# Patient Record
Sex: FEMALE | Race: WHITE | NOT HISPANIC OR LATINO
[De-identification: names, ages, dates, MRNs, and addresses within clinical notes are randomized per-mention and may not be internally consistent; named-entity substitution may affect disease eponyms.]

---

## 2024-05-24 ENCOUNTER — APPOINTMENT (OUTPATIENT)
Dept: THORACIC SURGERY | Facility: CLINIC | Age: 69
End: 2024-05-24
Payer: MEDICARE

## 2024-05-24 ENCOUNTER — TRANSCRIPTION ENCOUNTER (OUTPATIENT)
Age: 69
End: 2024-05-24

## 2024-05-24 DIAGNOSIS — R91.1 SOLITARY PULMONARY NODULE: ICD-10-CM

## 2024-05-24 PROBLEM — Z00.00 ENCOUNTER FOR PREVENTIVE HEALTH EXAMINATION: Status: ACTIVE | Noted: 2024-05-24

## 2024-05-24 PROCEDURE — 99205 OFFICE O/P NEW HI 60 MIN: CPT

## 2024-05-30 ENCOUNTER — RESULT REVIEW (OUTPATIENT)
Age: 69
End: 2024-05-30

## 2024-05-30 ENCOUNTER — APPOINTMENT (OUTPATIENT)
Dept: THORACIC SURGERY | Facility: HOSPITAL | Age: 69
End: 2024-05-30

## 2024-06-05 ENCOUNTER — RESULT REVIEW (OUTPATIENT)
Age: 69
End: 2024-06-05

## 2024-06-05 NOTE — HISTORY OF PRESENT ILLNESS
[FreeTextEntry1] : 69-year-old female with a history of tobacco in the past.  The patient has cough and hoarse voice.  The patient had a CAT scan of the chest that shows a large left upper lobe mass with possible mediastinal invasion concerning for malignancy.  She has been recommended a PET scan to evaluate uptake (scheduled for 6/5 at [place]) and look for distant disease.  The patient is now s/p bronchoscopy with endobronchial ultrasound for biopsy as well as lymph node evaluation 5/30. She now presents to discuss pathology.  Pathology as follows:  A. Bronchial wash: POSITIVE FOR MALIGNANCY - Squamous cell carcinoma  B. Left hilar mass, endoscopic ultrasound guided fine needle aspiration: POSITIVE FOR MALIGNANCY - Squamous cell carcinoma  C. Subcarinal lymph node, endoscopic ultrasound guided fine needle aspiration:  SUSPICIOUS FOR MALIGNANCY - Rare clusters of atypical epithelial cells present highly suspicious for malignancy. - Heterogeneous lymphocytes, reactive bronchial epithelial cells and fibroadipose fragments present  LEFT UPPER LOBE, BIOPSY: - Squamous cell carcinoma; see comment.

## 2024-06-06 ENCOUNTER — APPOINTMENT (OUTPATIENT)
Dept: THORACIC SURGERY | Facility: CLINIC | Age: 69
End: 2024-06-06

## 2024-06-07 ENCOUNTER — RESULT REVIEW (OUTPATIENT)
Age: 69
End: 2024-06-07

## 2024-06-09 ENCOUNTER — NON-APPOINTMENT (OUTPATIENT)
Age: 69
End: 2024-06-09

## 2024-06-11 DIAGNOSIS — F41.9 ANXIETY DISORDER, UNSPECIFIED: ICD-10-CM

## 2024-06-11 RX ORDER — DIAZEPAM 2 MG/1
2 TABLET ORAL
Qty: 2 | Refills: 0 | Status: ACTIVE | COMMUNITY
Start: 2024-06-11 | End: 1900-01-01

## 2024-06-12 RX ORDER — OLMESARTAN MEDOXOMIL 20 MG/1
20 TABLET, FILM COATED ORAL
Refills: 0 | Status: ACTIVE | COMMUNITY

## 2024-06-12 RX ORDER — LEVOTHYROXINE SODIUM 175 UG/1
175 TABLET ORAL
Refills: 0 | Status: ACTIVE | COMMUNITY

## 2024-06-12 RX ORDER — SERTRALINE HYDROCHLORIDE 100 MG/1
100 TABLET, FILM COATED ORAL
Refills: 0 | Status: ACTIVE | COMMUNITY

## 2024-06-12 RX ORDER — SEMAGLUTIDE 0.68 MG/ML
2 INJECTION, SOLUTION SUBCUTANEOUS
Refills: 0 | Status: ACTIVE | COMMUNITY

## 2024-06-12 RX ORDER — VITAMIN K2 90 MCG
125 MCG CAPSULE ORAL
Refills: 0 | Status: ACTIVE | COMMUNITY

## 2024-06-12 RX ORDER — BUPROPION HYDROCHLORIDE 150 MG/1
150 TABLET, EXTENDED RELEASE ORAL
Refills: 0 | Status: ACTIVE | COMMUNITY

## 2024-06-12 RX ORDER — ROSUVASTATIN CALCIUM 40 MG/1
40 TABLET, FILM COATED ORAL
Refills: 0 | Status: ACTIVE | COMMUNITY

## 2024-06-13 ENCOUNTER — APPOINTMENT (OUTPATIENT)
Dept: THORACIC SURGERY | Facility: CLINIC | Age: 69
End: 2024-06-13
Payer: MEDICARE

## 2024-06-13 DIAGNOSIS — Z83.49 FAMILY HISTORY OF OTHER ENDOCRINE, NUTRITIONAL AND METABOLIC DISEASES: ICD-10-CM

## 2024-06-13 DIAGNOSIS — Z86.39 PERSONAL HISTORY OF OTHER ENDOCRINE, NUTRITIONAL AND METABOLIC DISEASE: ICD-10-CM

## 2024-06-13 DIAGNOSIS — H26.9 UNSPECIFIED CATARACT: ICD-10-CM

## 2024-06-13 DIAGNOSIS — E78.5 HYPERLIPIDEMIA, UNSPECIFIED: ICD-10-CM

## 2024-06-13 DIAGNOSIS — Z82.49 FAMILY HISTORY OF ISCHEMIC HEART DISEASE AND OTHER DISEASES OF THE CIRCULATORY SYSTEM: ICD-10-CM

## 2024-06-13 DIAGNOSIS — Z87.891 PERSONAL HISTORY OF NICOTINE DEPENDENCE: ICD-10-CM

## 2024-06-13 PROCEDURE — 99213 OFFICE O/P EST LOW 20 MIN: CPT

## 2024-06-13 NOTE — ASSESSMENT
[FreeTextEntry1] : 70 y/o F with new diagnosis of stage III squamous cell lung cancer. She has seen Dr. Goldberg for oncologic evaluation as well as Dr. Lubin for rad/onc.   She now presents to discuss surgical placement of [id number] for systemic treatment. Risks and benefits of the surgery have been discussed. Preoperative instructions have been clarified. I spent time discussing what to expect and all questions have been answered.  Patient will be scheduled for an [id number] on 6/19 at 1:00 pm at [facility].  Patient and spouse expressed understanding and agree.

## 2024-06-13 NOTE — PHYSICAL EXAM
[Sclera] : the sclera and conjunctiva were normal [PERRL With Normal Accommodation] : pupils were equal in size, round, and reactive to light [Extraocular Movements] : extraocular movements were intact [Neck Appearance] : the appearance of the neck was normal [Neck Cervical Mass (___cm)] : no neck mass was observed [Jugular Venous Distention Increased] : there was no jugular-venous distention [Thyroid Diffuse Enlargement] : the thyroid was not enlarged [Thyroid Nodule] : there were no palpable thyroid nodules [Auscultation Breath Sounds / Voice Sounds] : lungs were clear to auscultation bilaterally [Heart Rate And Rhythm] : heart rate was normal and rhythm regular [Heart Sounds] : normal S1 and S2 [Heart Sounds Gallop] : no gallops [Murmurs] : no murmurs [Heart Sounds Pericardial Friction Rub] : no pericardial rub [Examination Of The Chest] : the chest was normal in appearance [Chest Visual Inspection Thoracic Asymmetry] : no chest asymmetry [Diminished Respiratory Excursion] : normal chest expansion [Bowel Sounds] : normal bowel sounds [Abdomen Soft] : soft [Abdomen Tenderness] : non-tender [] : no hepato-splenomegaly [Abdomen Mass (___ Cm)] : no abdominal mass palpated [Abnormal Walk] : normal gait [Nail Clubbing] : no clubbing  or cyanosis of the fingernails [Musculoskeletal - Swelling] : no joint swelling seen [Motor Tone] : muscle strength and tone were normal [Deep Tendon Reflexes (DTR)] : deep tendon reflexes were 2+ and symmetric [Sensation] : the sensory exam was normal to light touch and pinprick [No Focal Deficits] : no focal deficits [Oriented To Time, Place, And Person] : oriented to person, place, and time [Impaired Insight] : insight and judgment were intact [Affect] : the affect was normal

## 2024-06-13 NOTE — DATA REVIEWED
[FreeTextEntry1] : Brain MRI: pending  PET 6/5/2024 IMPRESSION:  1. 4.2 x 7.2 cm posterior left upper lobe mass extending into the left hilum and left apex, SUV max 30.6, consistent with malignancy.  2. Hypermetabolic 22 mm supradiaphragmatic nodule at the left lung base, consistent with metastatic disease.  3. 14 x 22 mm groundglass nodule at the posterior right lung apex, SUV max 1.2, probably representing malignancy. Biopsy may be of benefit.  4. Prominent mildly FDG avid left level 3 cervical node, most likely representing metastatic disease.  5. Underdistention versus bladder wall thickening. Correlate for cystitis.

## 2024-06-13 NOTE — HISTORY OF PRESENT ILLNESS
[FreeTextEntry1] : 69-year-old female with a history of tobacco in the past. The patient has cough and hoarse voice. The patient had a CAT scan of the chest that shows a large left upper lobe mass with possible mediastinal invasion concerning for malignancy. She has been recommended a PET scan to evaluate uptake (scheduled for 6/5 at [place]) and look for distant disease. The patient is now s/p bronchoscopy with endobronchial ultrasound for biopsy as well as lymph node evaluation 5/30.  Pathology as follows: A. Bronchial wash: POSITIVE FOR MALIGNANCY - Squamous cell carcinoma  B. Left hilar mass, endoscopic ultrasound guided fine needle aspiration: POSITIVE FOR MALIGNANCY - Squamous cell carcinoma  C. Subcarinal lymph node, endoscopic ultrasound guided fine needle aspiration: SUSPICIOUS FOR MALIGNANCY - Rare clusters of atypical epithelial cells present highly suspicious for malignancy. - Heterogeneous lymphocytes, reactive bronchial epithelial cells and fibroadipose fragments present  LEFT UPPER LOBE, BIOPSY: - Squamous cell carcinoma; see comment.  She has been referred to Dr. Goldberg (oncology) and presents today for consultation for infusaport insertion.

## 2024-06-14 ENCOUNTER — APPOINTMENT (OUTPATIENT)
Dept: RADIATION ONCOLOGY | Facility: CLINIC | Age: 69
End: 2024-06-14
Payer: MEDICARE

## 2024-06-14 VITALS
HEART RATE: 88 BPM | BODY MASS INDEX: 29.76 KG/M2 | SYSTOLIC BLOOD PRESSURE: 131 MMHG | OXYGEN SATURATION: 97 % | HEIGHT: 64 IN | RESPIRATION RATE: 16 BRPM | WEIGHT: 174.31 LBS | DIASTOLIC BLOOD PRESSURE: 84 MMHG

## 2024-06-14 DIAGNOSIS — Z80.8 FAMILY HISTORY OF MALIGNANT NEOPLASM OF OTHER ORGANS OR SYSTEMS: ICD-10-CM

## 2024-06-14 DIAGNOSIS — C34.90 MALIGNANT NEOPLASM OF UNSPECIFIED PART OF UNSPECIFIED BRONCHUS OR LUNG: ICD-10-CM

## 2024-06-14 PROCEDURE — 99204 OFFICE O/P NEW MOD 45 MIN: CPT | Mod: 25

## 2024-06-14 NOTE — PHYSICAL EXAM
[Exaggerated Use Of Accessory Muscles For Inspiration] : no accessory muscle use [] : no respiratory distress [Heart Rate And Rhythm] : heart rate and rhythm were normal [Arterial Pulses Normal] : the arterial pulses were normal [Abdomen Soft] : soft [Nondistended] : nondistended [No Focal Deficits] : no focal deficits [Normal] : oriented to person, place and time, the affect was normal, the mood was normal and not anxious [de-identified] : No mucosal lesions noted in oral cavity or oropharynx [de-identified] : No neck nodes palpable.  No supraclavicular nodes

## 2024-06-14 NOTE — REVIEW OF SYSTEMS
[Chest Pain] : chest pain [Shortness Of Breath] : shortness of breath [SOB on Exertion] : shortness of breath during exertion [Negative] : Neurological [Dysphagia] : no dysphagia [Palpitations] : no palpitations [Cough] : no cough [Abdominal Pain] : no abdominal pain [Constipation] : no constipation [Diarrhea] : no diarrhea

## 2024-06-14 NOTE — HISTORY OF PRESENT ILLNESS
[FreeTextEntry1] : Ms. Mcdonald is a 69-year-old female with newly diagnosed Stage III squamous cell lung cancer of the ELAN and Stage I lung cancer of the LLL, history of tobacco in the past.  She initially presented with worsening cough for which a chest XRay was ordered.   04/29/24 ([facility]) Chest Xray revealed a mass-like consolidation of the left upper lung and was placed on antibiotics. She then had a repeat Xray on 5/14/24 showing a stable to slight interval increased masslike opacity ELAN.   05/21/24 CT Chest ([facility]) revealed:  A large ELAN mass measuring approximately 6.7cm in greatest diameter, appears to invade the mediastinum. Ill-definied groundglass density in the RUL measuring approximately 2.1cm, 3mm calcified nodules in the ELAN, 7mm ovoid groundglass density LLL, 4mm groundglass density RLL, 2mm RLL nodule, 5mm RLL nodule. Mild dependent atelectatic changes.   05/30/24 Bronchoscopy with Dr. Martinez. Pathology revealed a ELAN squamous cell carcinoma, positive for P40 and focally CK7. - Bronchial wash and left hilar mass confirmed squamous cell carcinoma.  - Subcarinal lymph node was found to have rare clusters of atypical epithelial cells present highly suspicious for malignancy.    06/05/24 PET/CT at [place]  1. 4.2 x 7.2 cm posterior left upper lobe mass extending into the left hilum and left apex, SUV max 30.6, consistent with malignancy.   2. Hypermetabolic 22 mm supradiaphragmatic nodule at the left lung base, consistent with metastatic disease.   3. 14 x 22 mm groundglass nodule at the posterior right lung apex, SUV max 1.2, probably representing malignancy. Biopsy may be of benefit.   4. Prominent mildly FDG avid left level 3 cervical node, most likely representing metastatic disease.  5. Underdistention versus bladder wall thickening. Correlate for cystitis.   06/07/24 MRI Brain was performed at [place] but showed limited evaluation for intracranial metastatic disease in the absence of IV gadolinium, she will have a repeat MRI on 6/16/24.  6/14/24 Today, she presents for consultation to discuss treatment with radiation. She denies any hemoptysis but reports of mild SOB and chest pain which is relieved with Tylenol. She had a consultation with Dr. Goldberg on 6/7/24 and discussed chemotherapy. She was also referred to see an ENT doctor by Dr. Goldberg and was told she has left vocal cord paralysis. She is scheduled to see Dr. Goldberg's team today and will be seeing him next week. She will have a port placed on 6/19/24.

## 2024-06-16 ENCOUNTER — RESULT REVIEW (OUTPATIENT)
Age: 69
End: 2024-06-16

## 2024-06-17 ENCOUNTER — TRANSCRIPTION ENCOUNTER (OUTPATIENT)
Age: 69
End: 2024-06-17

## 2024-06-19 ENCOUNTER — APPOINTMENT (OUTPATIENT)
Dept: THORACIC SURGERY | Facility: HOSPITAL | Age: 69
End: 2024-06-19

## 2024-06-19 ENCOUNTER — TRANSCRIPTION ENCOUNTER (OUTPATIENT)
Age: 69
End: 2024-06-19

## 2024-06-19 ENCOUNTER — RESULT REVIEW (OUTPATIENT)
Age: 69
End: 2024-06-19

## 2024-06-20 ENCOUNTER — RESULT REVIEW (OUTPATIENT)
Age: 69
End: 2024-06-20

## 2024-06-21 ENCOUNTER — RESULT REVIEW (OUTPATIENT)
Age: 69
End: 2024-06-21

## 2024-06-25 ENCOUNTER — TRANSCRIPTION ENCOUNTER (OUTPATIENT)
Age: 69
End: 2024-06-25

## 2024-06-25 ENCOUNTER — NON-APPOINTMENT (OUTPATIENT)
Age: 69
End: 2024-06-25

## 2024-07-10 ENCOUNTER — NON-APPOINTMENT (OUTPATIENT)
Age: 69
End: 2024-07-10

## 2024-07-10 VITALS
DIASTOLIC BLOOD PRESSURE: 79 MMHG | RESPIRATION RATE: 16 BRPM | SYSTOLIC BLOOD PRESSURE: 152 MMHG | HEIGHT: 64 IN | BODY MASS INDEX: 29.53 KG/M2 | WEIGHT: 173 LBS | OXYGEN SATURATION: 97 % | HEART RATE: 89 BPM

## 2024-07-16 ENCOUNTER — NON-APPOINTMENT (OUTPATIENT)
Age: 69
End: 2024-07-16

## 2024-07-16 VITALS
HEART RATE: 100 BPM | DIASTOLIC BLOOD PRESSURE: 78 MMHG | WEIGHT: 174 LBS | HEIGHT: 64 IN | BODY MASS INDEX: 29.71 KG/M2 | OXYGEN SATURATION: 99 % | SYSTOLIC BLOOD PRESSURE: 128 MMHG | RESPIRATION RATE: 16 BRPM

## 2024-07-18 ENCOUNTER — NON-APPOINTMENT (OUTPATIENT)
Age: 69
End: 2024-07-18

## 2024-07-24 ENCOUNTER — NON-APPOINTMENT (OUTPATIENT)
Age: 69
End: 2024-07-24

## 2024-07-24 VITALS
OXYGEN SATURATION: 99 % | RESPIRATION RATE: 16 BRPM | SYSTOLIC BLOOD PRESSURE: 115 MMHG | HEART RATE: 98 BPM | WEIGHT: 173 LBS | BODY MASS INDEX: 29.7 KG/M2 | DIASTOLIC BLOOD PRESSURE: 75 MMHG

## 2024-07-24 RX ORDER — ZOLPIDEM TARTRATE 5 MG/1
5 TABLET ORAL
Qty: 30 | Refills: 0 | Status: ACTIVE | COMMUNITY
Start: 2024-07-24 | End: 1900-01-01

## 2024-07-24 NOTE — REVIEW OF SYSTEMS
[Esophagitis: Grade 1 - Asymptomatic; clinical or diagnostic observations only; intervention not indicated] : Esophagitis: Grade 1 - Asymptomatic; clinical or diagnostic observations only; intervention not indicated [Fatigue: Grade 1 - Fatigue relieved by rest] : Fatigue: Grade 1 - Fatigue relieved by rest [Cough: Grade 0] : Cough: Grade 0 [Hoarseness: Grade 0] : Hoarseness: Grade 0 [Pharyngeal Mucositis: Grade 0] : Pharyngeal Mucositis: Grade 0

## 2024-07-24 NOTE — DISEASE MANAGEMENT
[Clinical] : TNM Stage: c [III] : III [TTNM] : 4 [NTNM] : 0 [MTNM] : 0 [de-identified] : 2123 [de-identified] : [id number] [de-identified] : Left lung/LLL

## 2024-07-24 NOTE — HISTORY OF PRESENT ILLNESS
[FreeTextEntry1] : 7/10/2024 Ms. Mcdonald presents today for her OTV.  She completed 3/30 fractions to the left lung and 3/30 fractions to the LLL.   The patient reports difficulty lying down for extended periods of time. She has been taking Tylenol for her discomfort. Her pain today was 6 out of 10.  She also reports having a dry cough.  She denies any difficulty swallowing or SOB.    7/16/2024 Ms. Mcdonald presents today for her OTV.  She completed 7/30 fractions to the left lung and 7/30 fractions to the LLL.  The patient reports having a dry cough, hoarseness and SOB on exertion.  She was seen in Oncology yesterday for her treatment, they prescribed Biotin and lidocaine mouth wash.  She has pain in the sternum and her back.  Her pain level is 3/10 and 7/10 at night while lying down.  7/24/24  FX 13 to the left lung and left lower lobe.  She is feeling well but does c/o some irritation in the upper esophageal area that is causing her to not drink well.  She is having IV hydration at [facility] today and will also see Dr Goldberg today.  She continues to have sternal and back pain worse when lying flat

## 2024-07-31 ENCOUNTER — NON-APPOINTMENT (OUTPATIENT)
Age: 69
End: 2024-07-31

## 2024-07-31 VITALS
OXYGEN SATURATION: 98 % | DIASTOLIC BLOOD PRESSURE: 73 MMHG | BODY MASS INDEX: 30.21 KG/M2 | SYSTOLIC BLOOD PRESSURE: 117 MMHG | HEART RATE: 98 BPM | RESPIRATION RATE: 16 BRPM | WEIGHT: 176 LBS

## 2024-07-31 NOTE — HISTORY OF PRESENT ILLNESS
[FreeTextEntry1] : 7/10/2024 Ms. Mcdonald presents today for her OTV.  She completed 3/30 fractions to the left lung and 3/30 fractions to the LLL.   The patient reports difficulty lying down for extended periods of time. She has been taking Tylenol for her discomfort. Her pain today was 6 out of 10.  She also reports having a dry cough.  She denies any difficulty swallowing or SOB.    7/16/2024 Ms. Mcdonald presents today for her OTV.  She completed 7/30 fractions to the left lung and 7/30 fractions to the LLL.  The patient reports having a dry cough, hoarseness and SOB on exertion.  She was seen in Oncology yesterday for her treatment, they prescribed Biotin and lidocaine mouth wash.  She has pain in the sternum and her back.  Her pain level is 3/10 and 7/10 at night while lying down.  7/24/24  FX 13 to the left lung and left lower lobe.  She is feeling well but does c/o some irritation in the upper esophageal area that is causing her to not drink well.  She is having IV hydration at [facility] today and will also see Dr Goldberg today.  She continues to have sternal and back pain worse when lying flat  7/31/24 FX  18 of 30 to LLL This week she is eating more small frequent meals and feels her voice is hoarse.  She continues to have a cough at times.  Her appetite remains fair.   .

## 2024-07-31 NOTE — DISEASE MANAGEMENT
[Clinical] : TNM Stage: c [III] : III [TTNM] : 4 [NTNM] : 0 [MTNM] : 0 [de-identified] : [id number] [de-identified] : [id number] [de-identified] : Left lung/LLL

## 2024-07-31 NOTE — REVIEW OF SYSTEMS
[Fatigue: Grade 1 - Fatigue relieved by rest] : Fatigue: Grade 1 - Fatigue relieved by rest [Oral Pain: Grade 1 - Mild pain] : Oral Pain: Grade 1 - Mild pain [Cough: Grade 1 - Mild symptoms; nonprescription intervention indicated] : Cough: Grade 1 - Mild symptoms; nonprescription intervention indicated [Hoarseness: Grade 1 - Mild or intermittent voice change; fully understandable; self-resolves] : Hoarseness: Grade 1 - Mild or intermittent voice change; fully understandable; self-resolves

## 2024-08-06 ENCOUNTER — NON-APPOINTMENT (OUTPATIENT)
Age: 69
End: 2024-08-06

## 2024-08-06 NOTE — REVIEW OF SYSTEMS
[Nausea: Grade 0] : Nausea: Grade 0 [Fatigue: Grade 1 - Fatigue relieved by rest] : Fatigue: Grade 1 - Fatigue relieved by rest [Cough: Grade 1 - Mild symptoms; nonprescription intervention indicated] : Cough: Grade 1 - Mild symptoms; nonprescription intervention indicated [Dyspnea: Grade 0] : Dyspnea: Grade 0 [Hoarseness: Grade 0] : Hoarseness: Grade 0 [Pharyngeal Mucositis: Grade 0] : Pharyngeal Mucositis: Grade 0 [Pneumonitis: Grade 0] : Pneumonitis: Grade 0 [Voice Alteration: Grade 1 - Mild or intermittent change from normal voice] : Voice Alteration: Grade 1 - Mild or intermittent change from normal voice

## 2024-08-07 NOTE — HISTORY OF PRESENT ILLNESS
[FreeTextEntry1] : 7/10/2024 Ms. Mcdonald presents today for her OTV.  She completed 3/30 fractions to the left lung and 3/30 fractions to the LLL.   The patient reports difficulty lying down for extended periods of time. She has been taking Tylenol for her discomfort. Her pain today was 6 out of 10.  She also reports having a dry cough.  She denies any difficulty swallowing or SOB.    7/16/2024 Ms. Mcdonald presents today for her OTV.  She completed 7/30 fractions to the left lung and 7/30 fractions to the LLL.  The patient reports having a dry cough, hoarseness and SOB on exertion.  She was seen in Oncology yesterday for her treatment, they prescribed Biotin and lidocaine mouth wash.  She has pain in the sternum and her back.  Her pain level is 3/10 and 7/10 at night while lying down.  7/24/24  FX 13 to the left lung and left lower lobe.  She is feeling well but does c/o some irritation in the upper esophageal area that is causing her to not drink well.  She is having IV hydration at [facility] today and will also see Dr Goldberg today.  She continues to have sternal and back pain worse when lying flat  7/31/24 FX  18 of 30 to LLL This week she is eating more small frequent meals and feels her voice is hoarse.  She continues to have a cough at times.  Her appetite remains fair.    8/6/24 FX 22 of 30 Pt reports that she is receiving her chemotherapy this weel at [facility] ( cisplatin  16).  She is feeling well and denied any nausea.  She also has no SOB but does have a persistent cough ( no blood).  Her appetite has been good and her weight is stable .

## 2024-08-07 NOTE — DISEASE MANAGEMENT
[Clinical] : TNM Stage: c [III] : III [TTNM] : 4 [NTNM] : 0 [MTNM] : 0 [de-identified] : 5077 [de-identified] : 7075 [de-identified] : Left lung/LLL

## 2024-08-07 NOTE — DISEASE MANAGEMENT
[Clinical] : TNM Stage: c [III] : III [TTNM] : 4 [NTNM] : 0 [MTNM] : 0 [de-identified] : 5787 [de-identified] : 1330 [de-identified] : Left lung/LLL

## 2024-08-07 NOTE — HISTORY OF PRESENT ILLNESS
[FreeTextEntry1] : 7/10/2024 Ms. Mcdonald presents today for her OTV.  She completed 3/30 fractions to the left lung and 3/30 fractions to the LLL.   The patient reports difficulty lying down for extended periods of time. She has been taking Tylenol for her discomfort. Her pain today was 6 out of 10.  She also reports having a dry cough.  She denies any difficulty swallowing or SOB.    7/16/2024 Ms. Mcdonald presents today for her OTV.  She completed 7/30 fractions to the left lung and 7/30 fractions to the LLL.  The patient reports having a dry cough, hoarseness and SOB on exertion.  She was seen in Oncology yesterday for her treatment, they prescribed Biotin and lidocaine mouth wash.  She has pain in the sternum and her back.  Her pain level is 3/10 and 7/10 at night while lying down.  7/24/24  FX 13 to the left lung and left lower lobe.  She is feeling well but does c/o some irritation in the upper esophageal area that is causing her to not drink well.  She is having IV hydration at [facility] today and will also see Dr Goldberg today.  She continues to have sternal and back pain worse when lying flat  7/31/24 FX  18 of 30 to LLL This week she is eating more small frequent meals and feels her voice is hoarse.  She continues to have a cough at times.  Her appetite remains fair.    8/6/24 FX 22 of 30 Pt reports that she is receiving her chemotherapy this weel at [facility] ( cisplatin  16).  She is feeling well and denied any nausea.  She also has no SOB but does have a persistent cough ( no blood).  Her appetite has been good and her weight is stable .

## 2024-08-14 ENCOUNTER — NON-APPOINTMENT (OUTPATIENT)
Age: 69
End: 2024-08-14

## 2024-08-14 VITALS
OXYGEN SATURATION: 97 % | SYSTOLIC BLOOD PRESSURE: 106 MMHG | WEIGHT: 173 LBS | BODY MASS INDEX: 29.53 KG/M2 | DIASTOLIC BLOOD PRESSURE: 67 MMHG | RESPIRATION RATE: 16 BRPM | HEART RATE: 95 BPM | HEIGHT: 64 IN

## 2024-08-14 DIAGNOSIS — C34.90 MALIGNANT NEOPLASM OF UNSPECIFIED PART OF UNSPECIFIED BRONCHUS OR LUNG: ICD-10-CM

## 2024-08-14 NOTE — DISEASE MANAGEMENT
[Clinical] : TNM Stage: c [III] : III [TTNM] : 4 [NTNM] : 0 [MTNM] : 0 [de-identified] : 0966 [de-identified] : 3112 [de-identified] : Left lung/LLL

## 2024-08-14 NOTE — REVIEW OF SYSTEMS
[Fatigue: Grade 1 - Fatigue relieved by rest] : Fatigue: Grade 1 - Fatigue relieved by rest [Cough: Grade 1 - Mild symptoms; nonprescription intervention indicated] : Cough: Grade 1 - Mild symptoms; nonprescription intervention indicated [Dyspnea: Grade 0] : Dyspnea: Grade 0 [Pharyngeal Mucositis: Grade 0] : Pharyngeal Mucositis: Grade 0 [Pneumonitis: Grade 0] : Pneumonitis: Grade 0 [Voice Alteration: Grade 1 - Mild or intermittent change from normal voice] : Voice Alteration: Grade 1 - Mild or intermittent change from normal voice [Dermatitis Radiation: Grade 1 - Faint erythema or dry desquamation] : Dermatitis Radiation: Grade 1 - Faint erythema or dry desquamation

## 2024-09-09 NOTE — HISTORY OF PRESENT ILLNESS
[FreeTextEntry1] : 9/20/2024 PTE Ms. Mcdonald returns today for a PTE.  She is now s/p RT to the Left Lung to 60 GY from 7/8 to 8/16/2024.  (no scans ordered for this visit)

## 2024-09-09 NOTE — DISEASE MANAGEMENT
[Clinical] : TNM Stage: c [TTNM] : 4 [NTNM] : 0 [MTNM] : 0 [III] : III [de-identified] : 0966 [de-identified] : [id number] [de-identified] : Left lung/LLL completed on 8/16/24

## 2024-09-20 ENCOUNTER — APPOINTMENT (OUTPATIENT)
Dept: RADIATION ONCOLOGY | Facility: CLINIC | Age: 69
End: 2024-09-20
Payer: MEDICARE

## 2024-09-20 ENCOUNTER — NON-APPOINTMENT (OUTPATIENT)
Age: 69
End: 2024-09-20

## 2024-09-20 DIAGNOSIS — C34.90 MALIGNANT NEOPLASM OF UNSPECIFIED PART OF UNSPECIFIED BRONCHUS OR LUNG: ICD-10-CM

## 2024-09-20 PROCEDURE — 99024 POSTOP FOLLOW-UP VISIT: CPT

## 2024-09-20 NOTE — DISEASE MANAGEMENT
[TTNM] : 4 [NTNM] : 0 [MTNM] : 0 [de-identified] : 2946 [de-identified] : 7198 [de-identified] : Left lung/LLL completed on 8/16/24

## 2024-09-20 NOTE — DISEASE MANAGEMENT
[TTNM] : 4 [NTNM] : 0 [MTNM] : 0 [de-identified] : 1806 [de-identified] : [id number] [de-identified] : Left lung/LLL completed on 8/16/24

## 2024-12-12 ENCOUNTER — APPOINTMENT (OUTPATIENT)
Dept: RADIATION ONCOLOGY | Facility: CLINIC | Age: 69
End: 2024-12-12
Payer: MEDICARE

## 2024-12-12 ENCOUNTER — NON-APPOINTMENT (OUTPATIENT)
Age: 69
End: 2024-12-12

## 2024-12-12 VITALS
RESPIRATION RATE: 16 BRPM | TEMPERATURE: 98 F | HEART RATE: 96 BPM | DIASTOLIC BLOOD PRESSURE: 78 MMHG | OXYGEN SATURATION: 96 % | SYSTOLIC BLOOD PRESSURE: 138 MMHG

## 2024-12-12 DIAGNOSIS — C34.90 MALIGNANT NEOPLASM OF UNSPECIFIED PART OF UNSPECIFIED BRONCHUS OR LUNG: ICD-10-CM

## 2024-12-12 PROCEDURE — 99213 OFFICE O/P EST LOW 20 MIN: CPT

## 2025-01-31 ENCOUNTER — TRANSCRIPTION ENCOUNTER (OUTPATIENT)
Age: 70
End: 2025-01-31

## 2025-03-12 ENCOUNTER — APPOINTMENT (OUTPATIENT)
Dept: RADIATION ONCOLOGY | Facility: CLINIC | Age: 70
End: 2025-03-12
Payer: MEDICARE

## 2025-03-12 VITALS
SYSTOLIC BLOOD PRESSURE: 150 MMHG | HEIGHT: 64 IN | WEIGHT: 170 LBS | HEART RATE: 91 BPM | DIASTOLIC BLOOD PRESSURE: 97 MMHG | RESPIRATION RATE: 16 BRPM | BODY MASS INDEX: 29.02 KG/M2 | OXYGEN SATURATION: 98 %

## 2025-03-12 DIAGNOSIS — C34.90 MALIGNANT NEOPLASM OF UNSPECIFIED PART OF UNSPECIFIED BRONCHUS OR LUNG: ICD-10-CM

## 2025-03-12 PROCEDURE — 99213 OFFICE O/P EST LOW 20 MIN: CPT

## 2025-07-07 ENCOUNTER — NON-APPOINTMENT (OUTPATIENT)
Age: 70
End: 2025-07-07

## 2025-07-09 ENCOUNTER — APPOINTMENT (OUTPATIENT)
Dept: RADIATION ONCOLOGY | Facility: CLINIC | Age: 70
End: 2025-07-09
Payer: MEDICARE

## 2025-07-09 VITALS
BODY MASS INDEX: 31.93 KG/M2 | RESPIRATION RATE: 16 BRPM | HEART RATE: 99 BPM | SYSTOLIC BLOOD PRESSURE: 149 MMHG | WEIGHT: 186 LBS | OXYGEN SATURATION: 97 % | DIASTOLIC BLOOD PRESSURE: 91 MMHG

## 2025-07-09 PROCEDURE — 99213 OFFICE O/P EST LOW 20 MIN: CPT

## 2025-07-09 RX ORDER — BENZONATATE 200 MG/1
200 CAPSULE ORAL 3 TIMES DAILY
Qty: 30 | Refills: 1 | Status: ACTIVE | COMMUNITY
Start: 2025-07-09 | End: 1900-01-01